# Patient Record
Sex: MALE | Race: WHITE
[De-identification: names, ages, dates, MRNs, and addresses within clinical notes are randomized per-mention and may not be internally consistent; named-entity substitution may affect disease eponyms.]

---

## 2022-01-14 ENCOUNTER — HOSPITAL ENCOUNTER (OUTPATIENT)
Dept: HOSPITAL 63 - SURG | Age: 62
Discharge: HOME | End: 2022-01-14
Attending: INTERNAL MEDICINE
Payer: COMMERCIAL

## 2022-01-14 VITALS — SYSTOLIC BLOOD PRESSURE: 130 MMHG | DIASTOLIC BLOOD PRESSURE: 72 MMHG

## 2022-01-14 DIAGNOSIS — K63.89: ICD-10-CM

## 2022-01-14 DIAGNOSIS — Z72.89: ICD-10-CM

## 2022-01-14 DIAGNOSIS — D12.8: ICD-10-CM

## 2022-01-14 DIAGNOSIS — D12.5: ICD-10-CM

## 2022-01-14 DIAGNOSIS — Z79.899: ICD-10-CM

## 2022-01-14 DIAGNOSIS — Z12.11: Primary | ICD-10-CM

## 2022-01-14 DIAGNOSIS — Z87.891: ICD-10-CM

## 2022-01-14 DIAGNOSIS — Z98.890: ICD-10-CM

## 2022-01-14 DIAGNOSIS — Z86.010: ICD-10-CM

## 2022-01-14 PROCEDURE — 45381 COLONOSCOPY SUBMUCOUS NJX: CPT

## 2022-01-14 PROCEDURE — 88305 TISSUE EXAM BY PATHOLOGIST: CPT

## 2022-01-14 PROCEDURE — 45380 COLONOSCOPY AND BIOPSY: CPT

## 2022-01-14 PROCEDURE — 45385 COLONOSCOPY W/LESION REMOVAL: CPT

## 2022-01-18 NOTE — PATHOLOGY
[facility] Accession Number: [id number]

.                                                                01

Material submitted:                                        .

PART A: sigmoid colon - SIGMOID POLYP

PART B: colon - DESCENDING COLON POLYP BIOPSY. Modifiers: descending

PART C: rectum - RECTAL POLYP

PART D: sigmoid colon - SIGMOID COLON POLYP BIOPSY

.                                                                01

Clinical history:                                          .

FAMILY HX COLON CA, COLONOSCOPY

.                                                                02

**********************************************************************

Diagnosis:

A.  Colonic mucosa, sigmoid polyp:

- Serrated adenoma.

.

B.  Colonic mucosa, descending colon polyp biopsy and polyp:

- Hyperplastic polyps.

.

C.  Colorectal mucosa, rectal polyp:

- Tubular adenoma.

.

D.  Colonic mucosa, sigmoid colon polyp biopsy:

- Prominent mucosal fold with mucosal-associated lymphoid aggregate.

.

(ANITAM:kale; 01/18/2022)

[facility]  01/18/2022  13[address]

**********************************************************************

.                                                                02

Comment:

There is no high-grade dysplasia or evidence of malignancy.

(ANITAM:kale; 01/18/2022)

.                                                                02

Electronically signed:                                     .

Ty Paige MD, Pathologist

NPI- [id number]

.                                                                01

Gross description:                                         .

A.  The specimen is received in formalin, labeled "Jamel Trujillo, sigmoid

polyp".  Received is an irregular segment of brown-grey, polypoid tissue

measuring 1.3 x 1.0 x 0.9 cm in greatest dimensions.  The surgical margin

is inked.  The specimen is sectioned into 3 pieces and entirely submitted

in cassette A1.  During sectioning, fragmentation has occurred.

.

B.  The specimen is received in formalin, labeled "Trujillo Jamel,

descending colon polyp BX and polyp".  Received are multiple fragments of

pale tan tissue ranging in size from 0.2-0.6 cm in maximum dimensions.

The specimen is entirely submitted in cassette B1.

.

C.  The specimen is received in formalin, labeled "Jamel Trujillo, rectal

polyp".  Received is a single segment of pale tan, polypoid tissue

measuring 0.8 cm in greatest dimensions.  The surgical margin is inked.

The specimen is bisected and entirely submitted in cassette C1.  During

sectioning, fragmentation has occurred.

.

D.  The specimen is received in formalin, labeled "Jamel Trujillo, sigmoid

colon polyp BX".  Received are 2 segments of pale tan tissue measuring 0.3

and 0.5 cm in maximum dimensions.  The specimen is entirely submitted in

cassette D1.

([facility]; 1/17/2022)

[facility]  01/17/2022  1622 Local

.                                                                02

Pathologist provided ICD-10:

D12.5, K63.5, D12.8, Z86.010, Z12.11

.                                                                02

CPT                                                        .

777414, 967915, 479062, 341661

Specimen Comment: A courtesy copy of this report has been sent to [phone]

Specimen Comment: Report sent to 

Specimen Comment: A duplicate report has been generated due to demographic updates.

***Performed at:  01

   [facility]

   [address], [place]  [id number]

   MD Jose Toribio MD Phone:  [phone]

***Performed at:  02

   [facility]

   [address] [place]  [id number]

   MD Ty Paige MD Phone:  [phone]

## 2022-02-18 ENCOUNTER — HOSPITAL ENCOUNTER (EMERGENCY)
Dept: HOSPITAL 63 - ER | Age: 62
Discharge: HOME | End: 2022-02-18
Payer: COMMERCIAL

## 2022-02-18 VITALS — HEIGHT: 70 IN | BODY MASS INDEX: 24.93 KG/M2 | WEIGHT: 174.17 LBS

## 2022-02-18 VITALS — SYSTOLIC BLOOD PRESSURE: 132 MMHG | DIASTOLIC BLOOD PRESSURE: 52 MMHG
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
## 2022-02-18 DIAGNOSIS — I10: ICD-10-CM
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
## 2022-02-18 DIAGNOSIS — S82.892A: ICD-10-CM
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
## 2022-02-18 DIAGNOSIS — S82.832A: Primary | ICD-10-CM

## 2022-02-18 PROCEDURE — 96375 TX/PRO/DX INJ NEW DRUG ADDON: CPT

## 2022-02-18 PROCEDURE — 99152 MOD SED SAME PHYS/QHP 5/>YRS: CPT

## 2022-02-18 PROCEDURE — 27788 TREATMENT OF ANKLE FRACTURE: CPT

## 2022-02-18 PROCEDURE — 96374 THER/PROPH/DIAG INJ IV PUSH: CPT

## 2022-02-18 PROCEDURE — 73700 CT LOWER EXTREMITY W/O DYE: CPT

## 2022-02-18 PROCEDURE — 99285 EMERGENCY DEPT VISIT HI MDM: CPT

## 2022-02-18 PROCEDURE — 73600 X-RAY EXAM OF ANKLE: CPT

## 2022-02-18 NOTE — RAD
EXAM: 2 views of the left ankle



DATE: 2/18/2022 10:10 AM



INDICATION: Reason: fracture, deformity, fall / Spl. Instructions:  / History: 



COMPARISON: No Prior



FINDINGS/

IMPRESSION:

Examination limited by suboptimal projections.

1.  Oblique fracture distal fibular shaft in apex anterior angulation.

2.  There is dorsal dislocation of the talus relative to the tibia with associated rotatory component
.

3.  No talonavicular or subtalar joint dislocation. Suspected posterior tibial plafond/posterior mall
eolar fracture although evaluation limited given superimposed structures.



Electronically signed by: Imtiaz Hein MD (2/18/2022 10:26 AM) [id number]

## 2022-02-18 NOTE — RAD
EXAM: Left ankle CT without contrast.



HISTORY: Fracture closed reduction.



TECHNIQUE: Computed tomographic images of the left ankle were obtained without contrast.



*One or more of the following individualized dose reduction techniques were utilized for this examina
tion:  

1. Automated exposure control.  

2. Adjustment of the mA and/or kV according to patient size.  

3. Use of iterative reconstruction technique.



COMPARISON: Radiographs dated 2/18/2022.



FINDINGS: There is a displaced distal fibular metadiaphyseal fracture with approximately one half of 
a shaft widths displacement along the fracture line. There aren't few small surrounding fracture frag
ments, including a 1.7 cm butterfly fracture fragment along the posterior superior aspect of the frac
ture line. There is a displaced fracture of the medial posterior malleolus with approximately 4 mm di
splacement of the main fracture fragments. There is widening of the medial ankle mortise. There is a 
3 mm bone fragment within the medial ankle mortise, the donor site likely from the lateral talus. The
re are few additional tiny bone fragments surrounding the medial posterior malleolar fracture and ove
rlying the distal tibiofibular joint space. There is soft tissue edema and a joint effusion. The subt
alar joint is intact. There are a few small benign bone islands. There is enthesophyte at the [place]
s tendon insertion. There is no foreign body. 



IMPRESSION:

1. Mildly displaced oblique fracture of the distal fibular metadiaphysis with small surrounding fract
ure fragments.

2. Mildly displaced fracture of the medial posterior malleolus with surrounding fracture fragments.

3. Widening of the medial ankle mortise and small fracture fragment within the medial ankle mortise, 
likely from the adjacent medial talus.

4. Left ankle effusion and soft tissue swelling.



Electronically signed by: Bee Nguyen MD (2/18/2022 12:23 PM) [id number]

## 2022-02-18 NOTE — RAD
EXAM: 2 views of the left ankle



DATE: 2/18/2022 11:38 pm



INDICATION: Reason: FRACTURE AND DISLOCATION - POST REDUCTION / Spl. Instructions:  / History: 



COMPARISON: Earlier same day



FINDINGS/

IMPRESSION:



1.  Improved alignment of the obliquely oriented distal fibular fracture.



2. Interval closed reduction with improved alignment of the tibial talar joint. There is mild asymmet
lisa widening of the medial gutter.     

    Obliquely oriented minimally displaced fracture of the posterior tibial plafond/posterior malleol
us.



Electronically signed by: Juice Wright DO (2/18/2022 11:47 AM) [id number]

## 2025-06-09 NOTE — PHYS DOC
Adult General


HPI


HPI





Patient is a 61-year-old male presenting via POV for a fall.  Reports he slipped

and fell on the ice suffering obvious deformity to left ankle prompting him to 

come in.  Motor or sensory neuro function intact but reports 10 out of 10 pain 

with obvious bony deformity that is closed in nature.  States he has history of 

high blood pressure which he takes medications for, no other medical issues 

reported.  Not on any blood thinners.  No prior history of any left lower 

extremity abnormalities or surgical intervention





Review of Systems


Review of Systems


Fourteen body systems of review of systems have been reviewed. See HPI for 

pertinent positives and negative responses, other wise all other systems are 

negative, non-pertinent or non-contributory





Allergies


Allergies





Allergies








Coded Allergies Type Severity Reaction Last Updated Verified


 


  No Known Drug Allergies    1/14/22 No











Physical Exam


Physical Exam


Constitutional: Well developed, well nourished, moderate distress due to pain


HENT: Normocephalic, atraumatic, bilateral external ears normal, oropharynx 

moist, no oral exudates, nose normal. 


Eyes: PERRLA, EOMI, conjunctiva normal, no discharge.  


Neck: Normal range of motion, no tenderness, supple, no stridor.  


Cardiovascular: Heart rate regular, sinus rhythm, no murmurs rubs or gallops


Lungs & Thorax:  Bilateral breath sounds clear to auscultation 


Abdomen: Bowel sounds normal, soft, no tenderness, no masses, no pulsatile 

masses.  Nonsurgical abdomen, no peritoneal signs


Skin: Warm, dry, no erythema, no rash.  


Back: No tenderness, no CVA tenderness.  


Extremities: Obvious bony abnormality with palpable and visual deformity to left

ankle that appears dislocated in nature, cap refill of all distal digits less 

than 3 seconds, unremarkable examination of left hip, left knee with specific 

mention of no left posterior fibular head pain, soft tissue compartments of left

shin and left foot, no cyanosis, no clubbing.


Neurologic: Alert and oriented X 3, normal motor & sensory function of entire 

left lower extremity, no focal deficits noted. 


Psychologic: Anxious affect and mood





Current Patient Data


Vital Signs





Vital Signs








  Date Time  Temp Pulse Resp B/P (MAP) Pulse Ox O2 Delivery O2 Flow Rate FiO2


 


2/18/22 10:00   18  100 Room Air  








Vital Signs








  Date Time  Temp Pulse Resp B/P (MAP) Pulse Ox O2 Delivery O2 Flow Rate FiO2


 


2/18/22 10:00   18  100 Room Air  








Lab Results





Current Medications








 Medications


  (Trade)  Dose


 Ordered  Sig/Teresa


 Route


 PRN Reason  Start Time


 Stop Time Status Last Admin


Dose Admin


 


 Fentanyl Citrate


  (Fentanyl 2ml


 Vial)  75 mcg  1X  ONCE


 IVP


   2/18/22 10:00


 2/18/22 10:07 DC 2/18/22 10:00





 


 Ondansetron HCl


  (Zofran)  4 mg  1X  ONCE


 IVP


   2/18/22 10:15


 2/18/22 10:28 DC 2/18/22 10:15





 


 Ondansetron HCl


  (Zofran)  4 mg  STK-MED ONCE


 .ROUTE


   2/18/22 10:13


 2/18/22 10:13 DC  





 


 Ketamine HCl


  (Ketamine)  80 mg  1X  ONCE


 IV


   2/18/22 10:30


 2/18/22 10:31 DC 2/18/22 10:30





 


 Fentanyl Citrate


  (Fentanyl 2ml


 Vial)  50 mcg  1X  ONCE


 IVP


   2/18/22 11:30


 2/18/22 11:34 DC  





 


 Acetaminophen/


 Hydrocodone Bitart


  (Lortab 7.5/325)  1 tab  1X  ONCE


 PO


   2/18/22 13:00


 2/18/22 13:01 DC 2/18/22 13:00














EKG


EKG


[]





Radiology/Procedures


Radiology/Procedures





EXAM: 2 views of the left ankle





DATE: 2/18/2022 11:38 pm





INDICATION: Reason: FRACTURE AND DISLOCATION - POST REDUCTION / Spl. 

Instructions:  / History: 





COMPARISON: Earlier same day





FINDINGS/


IMPRESSION:





1.  Improved alignment of the obliquely oriented distal fibular fracture.





2. Interval closed reduction with improved alignment of the tibial talar joint. 

There is mild asymmetric widening of the medial gutter.     


    Obliquely oriented minimally displaced fracture of the posterior tibial 

plafond/posterior malleolus.





Electronically signed by: Juice Wright DO (2/18/2022 11:47 AM) [id number]





//////////////////////////////








EXAM: Left ankle CT without contrast.





HISTORY: Fracture closed reduction.





TECHNIQUE: Computed tomographic images of the left ankle were obtained without 

contrast.





*One or more of the following individualized dose reduction techniques were 

utilized for this examination:  


1. Automated exposure control.  


2. Adjustment of the mA and/or kV according to patient size.  


3. Use of iterative reconstruction technique.





COMPARISON: Radiographs dated 2/18/2022.





FINDINGS: There is a displaced distal fibular metadiaphyseal fracture with 

approximately one half of a shaft widths displacement along the fracture line. 

There aren't few small surrounding fracture fragments, including a 1.7 cm 

butterfly fracture fragment along the posterior superior aspect of the fracture 

line. There is a displaced fracture of the medial posterior malleolus with 

approximately 4 mm displacement of the main fracture fragments. There is 

widening of the medial ankle mortise. There is a 3 mm bone fragment within the 

medial ankle mortise, the donor site likely from the lateral talus. There are 

few additional tiny bone fragments surrounding the medial posterior malleolar 

fracture and overlying the distal tibiofibular joint space. There is soft tissue

 edema and a joint effusion. The subtalar joint is intact. There are a few small

 benign bone islands. There is enthesophyte at the Achilles tendon insertion. 

There is no foreign body. 





IMPRESSION:


1. Mildly displaced oblique fracture of the distal fibular metadiaphysis with 

small surrounding fracture fragments.


2. Mildly displaced fracture of the medial posterior malleolus with surrounding 

fracture fragments.


3. Widening of the medial ankle mortise and small fracture fragment within the 

medial ankle mortise, likely from the adjacent medial talus.


4. Left ankle effusion and soft tissue swelling.





Electronically signed by: Bee Nguyen MD (2/18/2022 12:23 PM) [id number]





Heart Score


C/O Chest Pain:  No


Risk Factors:


Risk Factors:  DM, Current or recent (<one month) smoker, HTN, HLP, family 

history of CAD, obesity.


Risk Scores:


Risk Factors:  DM, Current or recent (<one month) smoker, HTN, HLP, family 

history of CAD, obesity.





Course & Med Decision Making


Course & Med Decision Making


ABCs unremarkable


HPI and initial physical exam obvious for closed abnormality of left lower 

extremity.  Radiographs confirmed Colles' abnormality.  On-call foot specialist 

contacted who recommended closed reduction, splinting, postprocedural radiograph

 and subsequent CT imaging for presurgical mapping


Risks and benefits of above obtained with patient, he was amenable to plan of 

care.  Moderate sedation occurred, joint reduced on first attempt without issues

 and neurovascularly intact.  Postprocedural radiographs showed adequate 

reduction.  Foot splinted with CT imaging performed


Pain well controlled with provided ER intervention.  I discussed need for close 

outpatient primary care in addition to orthopedic follow-up for definitive 

surgical management in outpatient setting.  Strict return precautions were di

scussed at length with good understanding by patient.  Foot reassessed and 

remained intact to motor or sensory neuro function prior to ER departure





Critical Care Time


This patient required critical care. Due to the fact that the patient required a

 significant amount of one on one physician - patient contact time, ordering and

 review of studies, arranging urgent treatment with development of a management 

plan, evaluation of patients response to treatment with frequent reassessments,

 and discussions with other providers this patient required 35 minutes of 

critical care time. Critical care time was indicated due to the inherent insta

bility and/or potential for instability in this patient. The critical care time 

that is allocated to this patient is above and beyond any time spent on any 

other billable procedures performed on this patient.





Dragon Disclaimer


Dragon Disclaimer


This electronic medical record was generated, in whole or in part, using a voice

 recognition dictation system.





Departure


Departure:


Impression:  


   Primary Impression:  


   Closed left ankle fracture


Disposition:  01 HOME / SELF CARE / HOMELESS


Condition:  STABLE


Referrals:  


ZO ESCOBAR MD (PCP)








PATRICIA ESTRADA DPM


Patient Instructions:  Ankle Fracture





Additional Instructions:  


You were seen for a fracture or broken bone with subsequent dislocation of the 

left lower extremity.  We spoke with the orthopedic doctors who need to see you 

in the orthopedic clinic.  Their information is attached to your discharge 

packet.  As disclosed, you are able to follow-up with any other orthopedic 

provider you want and you were given CD images in addition to printed 

impressions of your diagnostic studies today. You should not use the affected 

body part until you follow up with orthopedics.  Keep the area clean, dry, and 

avoid getting it wet.  You should use ice, NSAIDs and/or Tylenol as primary 

means of pain control with prescribed opiate medications for severe/breakthrough

 pain, and elevation to help with swelling and pain.  Return to the ED if you 

develop worsening pain, numbness, tingling, weakness, fever, redness, or any 

other new or concerning symptoms.


Scripts


Hydrocodone Bit/Acetaminophen (HYDROCODONE-APAP 7.5-325  **) 1 Each Tablet


1 TAB PO PRN Q6HRS PRN for PAIN, #20 TAB 0 Refills


   Prov: BONNIE FRENCH DO         2/18/22





MODERATE SEDATION ASSESSMENT


RISKS/ALTERNATIVES


Risks/Alternatives


Risks and alternatives of this type of sedation and procedure discussed with:


RISK/ALTERNATIVES DISCUSSED:  Patient





H & P ON CHART


H & P


H & P on chart and reviewed for co-morbid conditions and appropriate labs.


H&P ON CHART:  Yes





PREGNANCY STATUS


PREG STATUS ASSESSED:  Yes





MEDS/ALLERGIES REVIEWED


Meds/Allergies Reviewed


Medications and Allergies including time and route of recently administered wilian

cotics and sedatives.


MEDS/ALLERGIES REVIEWED:  Yes





ASA RATING


ASA RATING:  II





AIRWAY ASSESSMENT


Airway Assessment


Airway patency, oral function limitations, presence of caps, crowns, dentures, 

partials, and ability to extend neck assessed.


AIRWAY ASSESSMENT:  Yes





MALLAMPATI SCORE


MALLAMPATI SCORE:  I





PRE-SEDATION ASSESSMENT


PRE-SEDATION PHYSICAL:  Yes





Joint Reduction


Joint Reduction :  


   Conscious Sedation:  Yes


   Reduction Attempts:  1


   Pre-Procedure NV Exam:  Yes


   Post-Procedure NV Exam:  Yes


   Post Joint Reduction Film:  joint reduced


Progress


Risks and benefits reviewed with patient and sister at bedside with all 

questions addressed, consent obtained


Timeout performed


80 mg ketamine with subsequent administration of 40 mg of ketamine administered 

for moderate sedation purposes


Traction to left lower extremity resulted in immediate translocation and 

ultimate reduction of patient's displaced left foot fracture.  Formal recheck 

performed and fully intact neurovascularly


Appropriate splint and post procedural radiographs obtained showing adequate 

reduction.  Motor or sensory neuro function remained intact after additional 

assessment after sedation resolution


Patient tolerated procedure well with no reported and/or observed complications











BONNIE FRENCH DO                 Feb 18, 2022 10:01 Troponin [id number].    Non-MI troponin elevation in setting of A-fib with RVR.